# Patient Record
Sex: MALE | Race: AMERICAN INDIAN OR ALASKA NATIVE | NOT HISPANIC OR LATINO | URBAN - METROPOLITAN AREA
[De-identification: names, ages, dates, MRNs, and addresses within clinical notes are randomized per-mention and may not be internally consistent; named-entity substitution may affect disease eponyms.]

---

## 2019-11-28 ENCOUNTER — HOSPITAL ENCOUNTER (INPATIENT)
Facility: HOSPITAL | Age: 59
LOS: 1 days | Discharge: HOME | DRG: 493 | End: 2019-11-30
Attending: EMERGENCY MEDICINE | Admitting: ORTHOPAEDIC SURGERY
Payer: COMMERCIAL

## 2019-11-28 ENCOUNTER — APPOINTMENT (EMERGENCY)
Dept: RADIOLOGY | Facility: HOSPITAL | Age: 59
DRG: 493 | End: 2019-11-28
Attending: EMERGENCY MEDICINE
Payer: COMMERCIAL
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
## 2019-11-28 DIAGNOSIS — R31.29 OTHER MICROSCOPIC HEMATURIA: ICD-10-CM

## 2019-11-28 DIAGNOSIS — S20.229A CONTUSION OF BACK, UNSPECIFIED LATERALITY, INITIAL ENCOUNTER: ICD-10-CM

## 2019-11-28 DIAGNOSIS — S82.851A CLOSED TRIMALLEOLAR FRACTURE OF RIGHT ANKLE, INITIAL ENCOUNTER: ICD-10-CM

## 2019-11-28 LAB
ABO + RH BLD: NORMAL
ANION GAP SERPL CALC-SCNC: 10 MEQ/L (ref 3–15)
APTT PPP: 23 SEC (ref 23–35)
BACTERIA URNS QL MICRO: NORMAL /HPF
BASOPHILS # BLD: 0.02 K/UL (ref 0.01–0.1)
BASOPHILS NFR BLD: 0.4 %
BILIRUB UR QL STRIP.AUTO: NEGATIVE MG/DL
BLD GP AB SCN SERPL QL: NEGATIVE
BUN SERPL-MCNC: 22 MG/DL (ref 8–20)
CALCIUM SERPL-MCNC: 8.7 MG/DL (ref 8.9–10.3)
CHLORIDE SERPL-SCNC: 101 MEQ/L (ref 98–109)
CLARITY UR REFRACT.AUTO: CLEAR
CO2 SERPL-SCNC: 25 MEQ/L (ref 22–32)
COLOR UR AUTO: YELLOW
CREAT SERPL-MCNC: 1 MG/DL
D AG BLD QL: POSITIVE
DIFFERENTIAL METHOD BLD: NORMAL
EOSINOPHIL # BLD: 0.12 K/UL (ref 0.04–0.54)
EOSINOPHIL NFR BLD: 2.3 %
ERYTHROCYTE [DISTWIDTH] IN BLOOD BY AUTOMATED COUNT: 12.9 % (ref 11.6–14.4)
GFR SERPL CREATININE-BSD FRML MDRD: >60 ML/MIN/1.73M*2
GLUCOSE SERPL-MCNC: 94 MG/DL (ref 70–99)
GLUCOSE UR STRIP.AUTO-MCNC: NEGATIVE MG/DL
HCT VFR BLDCO AUTO: 43.2 % (ref 40.1–51)
HGB BLD-MCNC: 14.3 G/DL
HGB UR QL STRIP.AUTO: 2
HYALINE CASTS #/AREA URNS LPF: NORMAL /LPF
IMM GRANULOCYTES # BLD AUTO: 0.02 K/UL (ref 0–0.08)
IMM GRANULOCYTES NFR BLD AUTO: 0.4 %
INR PPP: 1 INR
KETONES UR STRIP.AUTO-MCNC: NEGATIVE MG/DL
LABORATORY COMMENT REPORT: NORMAL
LEUKOCYTE ESTERASE UR QL STRIP.AUTO: NEGATIVE
LYMPHOCYTES # BLD: 2.07 K/UL (ref 1.2–3.5)
LYMPHOCYTES NFR BLD: 39.2 %
MCH RBC QN AUTO: 31.2 PG (ref 28–33.2)
MCHC RBC AUTO-ENTMCNC: 33.1 G/DL (ref 32.2–36.5)
MCV RBC AUTO: 94.1 FL (ref 83–98)
MONOCYTES # BLD: 0.45 K/UL (ref 0.3–1)
MONOCYTES NFR BLD: 8.5 %
NEUTROPHILS # BLD: 2.6 K/UL (ref 1.7–7)
NEUTS SEG NFR BLD: 49.2 %
NITRITE UR QL STRIP.AUTO: NEGATIVE
NRBC BLD-RTO: 0 %
PDW BLD AUTO: 9.8 FL (ref 9.4–12.4)
PH UR STRIP.AUTO: 6.5 [PH]
PLATELET # BLD AUTO: 193 K/UL
POTASSIUM SERPL-SCNC: 3.7 MEQ/L (ref 3.6–5.1)
PROT UR QL STRIP.AUTO: NEGATIVE
PROTHROMBIN TIME: 12.6 SEC (ref 12.2–14.5)
RBC # BLD AUTO: 4.59 M/UL (ref 4.5–5.8)
RBC #/AREA URNS HPF: NORMAL /HPF
SODIUM SERPL-SCNC: 136 MEQ/L (ref 136–144)
SP GR UR REFRACT.AUTO: 1.01
SQUAMOUS URNS QL MICRO: NORMAL /HPF
UROBILINOGEN UR STRIP-ACNC: 0.2 EU/DL
WBC # BLD AUTO: 5.28 K/UL
WBC #/AREA URNS HPF: NORMAL /HPF

## 2019-11-28 PROCEDURE — 63600000 HC DRUGS/DETAIL CODE

## 2019-11-28 PROCEDURE — 73610 X-RAY EXAM OF ANKLE: CPT | Mod: RT

## 2019-11-28 PROCEDURE — 82550 ASSAY OF CK (CPK): CPT | Performed by: HOSPITALIST

## 2019-11-28 PROCEDURE — 96374 THER/PROPH/DIAG INJ IV PUSH: CPT | Mod: 59

## 2019-11-28 PROCEDURE — 63600000 HC DRUGS/DETAIL CODE: Performed by: PHYSICIAN ASSISTANT

## 2019-11-28 PROCEDURE — 85610 PROTHROMBIN TIME: CPT | Performed by: PHYSICIAN ASSISTANT

## 2019-11-28 PROCEDURE — 85025 COMPLETE CBC W/AUTO DIFF WBC: CPT | Performed by: PHYSICIAN ASSISTANT

## 2019-11-28 PROCEDURE — 80048 BASIC METABOLIC PNL TOTAL CA: CPT | Performed by: PHYSICIAN ASSISTANT

## 2019-11-28 PROCEDURE — 25800000 HC PHARMACY IV SOLUTIONS: Performed by: PHYSICIAN ASSISTANT

## 2019-11-28 PROCEDURE — 36415 COLL VENOUS BLD VENIPUNCTURE: CPT | Performed by: PHYSICIAN ASSISTANT

## 2019-11-28 PROCEDURE — 93005 ELECTROCARDIOGRAM TRACING: CPT | Performed by: PHYSICIAN ASSISTANT

## 2019-11-28 PROCEDURE — 96361 HYDRATE IV INFUSION ADD-ON: CPT | Mod: 59

## 2019-11-28 PROCEDURE — 85730 THROMBOPLASTIN TIME PARTIAL: CPT | Performed by: PHYSICIAN ASSISTANT

## 2019-11-28 PROCEDURE — 96376 TX/PRO/DX INJ SAME DRUG ADON: CPT | Mod: 59

## 2019-11-28 PROCEDURE — 99285 EMERGENCY DEPT VISIT HI MDM: CPT | Mod: 25

## 2019-11-28 PROCEDURE — 81001 URINALYSIS AUTO W/SCOPE: CPT | Performed by: PHYSICIAN ASSISTANT

## 2019-11-28 PROCEDURE — 73600 X-RAY EXAM OF ANKLE: CPT | Mod: RT

## 2019-11-28 PROCEDURE — 86850 RBC ANTIBODY SCREEN: CPT

## 2019-11-28 RX ORDER — HYDROMORPHONE HYDROCHLORIDE 1 MG/ML
INJECTION, SOLUTION INTRAMUSCULAR; INTRAVENOUS; SUBCUTANEOUS
Status: COMPLETED
Start: 2019-11-28 | End: 2019-11-28

## 2019-11-28 RX ORDER — HYDROMORPHONE HYDROCHLORIDE 1 MG/ML
0.5 INJECTION, SOLUTION INTRAMUSCULAR; INTRAVENOUS; SUBCUTANEOUS ONCE
Status: COMPLETED | OUTPATIENT
Start: 2019-11-28 | End: 2019-11-28

## 2019-11-28 RX ORDER — VALSARTAN 160 MG/1
160 TABLET ORAL DAILY
Status: ON HOLD | COMMUNITY
End: 2019-11-29

## 2019-11-28 RX ADMIN — HYDROMORPHONE HYDROCHLORIDE 0.5 MG: 1 INJECTION, SOLUTION INTRAMUSCULAR; INTRAVENOUS; SUBCUTANEOUS at 21:41

## 2019-11-28 RX ADMIN — SODIUM CHLORIDE 1000 ML: 9 INJECTION, SOLUTION INTRAVENOUS at 20:17

## 2019-11-28 RX ADMIN — SODIUM CHLORIDE 1000 ML: 9 INJECTION, SOLUTION INTRAVENOUS at 21:57

## 2019-11-28 RX ADMIN — HYDROMORPHONE HYDROCHLORIDE 0.5 MG: 1 INJECTION, SOLUTION INTRAMUSCULAR; INTRAVENOUS; SUBCUTANEOUS at 20:00

## 2019-11-28 ASSESSMENT — ENCOUNTER SYMPTOMS
HEADACHES: 0
ABDOMINAL DISTENTION: 0
FACIAL SWELLING: 0
NECK PAIN: 0
FEVER: 0
CHILLS: 0
BACK PAIN: 0
ARTHRALGIAS: 1
DIAPHORESIS: 0
CONSTIPATION: 0
CHEST TIGHTNESS: 0
NAUSEA: 0
DIFFICULTY BREATHING: 0
DIARRHEA: 0
SHORTNESS OF BREATH: 0
COLOR CHANGE: 0
ACTIVITY CHANGE: 0
LOSS OF CONSCIOUSNESS: 0
ABDOMINAL PAIN: 0
VOMITING: 0
FATIGUE: 0
WOUND: 0

## 2019-11-29 ENCOUNTER — APPOINTMENT (INPATIENT)
Dept: RADIOLOGY | Facility: HOSPITAL | Age: 59
DRG: 493 | End: 2019-11-29
Attending: EMERGENCY MEDICINE
Payer: COMMERCIAL

## 2019-11-29 ENCOUNTER — ANESTHESIA (INPATIENT)
Dept: OPERATING ROOM | Facility: HOSPITAL | Age: 59
DRG: 493 | End: 2019-11-29
Payer: COMMERCIAL

## 2019-11-29 ENCOUNTER — APPOINTMENT (INPATIENT)
Dept: RADIOLOGY | Facility: HOSPITAL | Age: 59
DRG: 493 | End: 2019-11-29
Attending: STUDENT IN AN ORGANIZED HEALTH CARE EDUCATION/TRAINING PROGRAM
Payer: COMMERCIAL

## 2019-11-29 ENCOUNTER — ANESTHESIA EVENT (INPATIENT)
Dept: OPERATING ROOM | Facility: HOSPITAL | Age: 59
DRG: 493 | End: 2019-11-29
Payer: COMMERCIAL

## 2019-11-29 ENCOUNTER — APPOINTMENT (INPATIENT)
Dept: RADIOLOGY | Facility: HOSPITAL | Age: 59
DRG: 493 | End: 2019-11-29
Attending: NURSE PRACTITIONER
Payer: COMMERCIAL

## 2019-11-29 ENCOUNTER — APPOINTMENT (INPATIENT)
Dept: RADIOLOGY | Facility: HOSPITAL | Age: 59
DRG: 493 | End: 2019-11-29
Attending: ORTHOPAEDIC SURGERY
Payer: COMMERCIAL

## 2019-11-29 PROBLEM — S82.851A CLOSED TRIMALLEOLAR FRACTURE OF RIGHT ANKLE: Status: ACTIVE | Noted: 2019-11-28

## 2019-11-29 PROBLEM — I10 ESSENTIAL HYPERTENSION: Status: ACTIVE | Noted: 2019-11-29

## 2019-11-29 PROBLEM — Z01.818 PRE-OP EVALUATION: Status: ACTIVE | Noted: 2019-11-29

## 2019-11-29 PROBLEM — R82.90 ABNORMAL FINDING ON URINALYSIS: Status: ACTIVE | Noted: 2019-11-29
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
## 2019-11-29 PROBLEM — D68.51 FACTOR V LEIDEN (CMS/HCC): Status: ACTIVE | Noted: 2019-11-29

## 2019-11-29 PROBLEM — Z86.718 HISTORY OF DVT (DEEP VEIN THROMBOSIS): Status: ACTIVE | Noted: 2019-11-29

## 2019-11-29 LAB
ATRIAL RATE: 68
CK SERPL-CCNC: 160 U/L (ref 16–300)
P AXIS: 42
PR INTERVAL: 160
QRS DURATION: 90
QT INTERVAL: 434
QTC CALCULATION(BAZETT): 461
R AXIS: 31
T WAVE AXIS: 56
VENTRICULAR RATE: 68

## 2019-11-29 PROCEDURE — 36000012 HC OR LEVEL 2 EA ADDL MIN: Performed by: ORTHOPAEDIC SURGERY

## 2019-11-29 PROCEDURE — 25000000 HC PHARMACY GENERAL: Performed by: ANESTHESIOLOGY

## 2019-11-29 PROCEDURE — 27200000 HC STERILE SUPPLY: Performed by: ORTHOPAEDIC SURGERY

## 2019-11-29 PROCEDURE — 71000001 HC PACU PHASE 1 INITIAL 30MIN: Performed by: ORTHOPAEDIC SURGERY

## 2019-11-29 PROCEDURE — 25800000 HC PHARMACY IV SOLUTIONS: Performed by: STUDENT IN AN ORGANIZED HEALTH CARE EDUCATION/TRAINING PROGRAM

## 2019-11-29 PROCEDURE — 63700000 HC SELF-ADMINISTRABLE DRUG: Performed by: STUDENT IN AN ORGANIZED HEALTH CARE EDUCATION/TRAINING PROGRAM

## 2019-11-29 PROCEDURE — 27800000 HC SUPPLY/IMPLANTS: Performed by: ORTHOPAEDIC SURGERY

## 2019-11-29 PROCEDURE — 73600 X-RAY EXAM OF ANKLE: CPT | Mod: RT

## 2019-11-29 PROCEDURE — 71045 X-RAY EXAM CHEST 1 VIEW: CPT

## 2019-11-29 PROCEDURE — 0QSJ35Z REPOSITION RIGHT FIBULA WITH EXTERNAL FIXATION DEVICE, PERCUTANEOUS APPROACH: ICD-10-PCS | Performed by: ORTHOPAEDIC SURGERY

## 2019-11-29 PROCEDURE — 63600000 HC DRUGS/DETAIL CODE: Performed by: STUDENT IN AN ORGANIZED HEALTH CARE EDUCATION/TRAINING PROGRAM

## 2019-11-29 PROCEDURE — 0QSG35Z REPOSITION RIGHT TIBIA WITH EXTERNAL FIXATION DEVICE, PERCUTANEOUS APPROACH: ICD-10-PCS | Performed by: ORTHOPAEDIC SURGERY

## 2019-11-29 PROCEDURE — C1713 ANCHOR/SCREW BN/BN,TIS/BN: HCPCS | Performed by: ORTHOPAEDIC SURGERY

## 2019-11-29 PROCEDURE — 71000011 HC PACU PHASE 1 EA ADDL MIN: Performed by: ORTHOPAEDIC SURGERY

## 2019-11-29 PROCEDURE — 63600105 HC IODINE BASED CONTRAST: Performed by: PHYSICIAN ASSISTANT

## 2019-11-29 PROCEDURE — 63600000 HC DRUGS/DETAIL CODE: Performed by: NURSE PRACTITIONER

## 2019-11-29 PROCEDURE — 12000000 HC ROOM AND CARE MED/SURG

## 2019-11-29 PROCEDURE — 25800000 HC PHARMACY IV SOLUTIONS: Performed by: ANESTHESIOLOGY

## 2019-11-29 PROCEDURE — 74177 CT ABD & PELVIS W/CONTRAST: CPT

## 2019-11-29 PROCEDURE — 36103180 FL FLUOROSCOPY TECHNICAL ASSISTANCE

## 2019-11-29 PROCEDURE — 63600000 HC DRUGS/DETAIL CODE: Mod: JW | Performed by: ANESTHESIOLOGY

## 2019-11-29 PROCEDURE — 99222 1ST HOSP IP/OBS MODERATE 55: CPT | Performed by: HOSPITALIST

## 2019-11-29 PROCEDURE — 73700 CT LOWER EXTREMITY W/O DYE: CPT | Mod: RT

## 2019-11-29 PROCEDURE — 71260 CT THORAX DX C+: CPT

## 2019-11-29 PROCEDURE — 36000002 HC OR LEVEL 2 INITIAL 30MIN: Performed by: ORTHOPAEDIC SURGERY

## 2019-11-29 PROCEDURE — 63600000 HC DRUGS/DETAIL CODE: Performed by: PHYSICIAN ASSISTANT

## 2019-11-29 PROCEDURE — 37000001 HC ANESTHESIA GENERAL: Performed by: ORTHOPAEDIC SURGERY

## 2019-11-29 DEVICE — ROD CARBON FIBER 11MM X 400MM: Type: IMPLANTABLE DEVICE | Site: ANKLE | Status: FUNCTIONAL

## 2019-11-29 DEVICE — IMPLANTABLE DEVICE: Type: IMPLANTABLE DEVICE | Site: ANKLE | Status: FUNCTIONAL

## 2019-11-29 DEVICE — CLAMP EXTERNAL FIXATOR MR SAFE: Type: IMPLANTABLE DEVICE | Site: ANKLE | Status: FUNCTIONAL

## 2019-11-29 DEVICE — ROD EXTERNAL FIXATOR: Type: IMPLANTABLE DEVICE | Site: ANKLE | Status: FUNCTIONAL

## 2019-11-29 DEVICE — ROD 11MM: Type: IMPLANTABLE DEVICE | Site: ANKLE | Status: FUNCTIONAL

## 2019-11-29 DEVICE — CLAMP COMBINATION MR SAFE: Type: IMPLANTABLE DEVICE | Site: ANKLE | Status: FUNCTIONAL

## 2019-11-29 RX ORDER — ENOXAPARIN SODIUM 100 MG/ML
40 INJECTION SUBCUTANEOUS
Qty: 5.6 ML | Refills: 0 | Status: SHIPPED | OUTPATIENT
Start: 2019-11-30 | End: 2019-12-14

## 2019-11-29 RX ORDER — SODIUM CHLORIDE 9 MG/ML
INJECTION, SOLUTION INTRAVENOUS CONTINUOUS
Status: ACTIVE | OUTPATIENT
Start: 2019-11-29 | End: 2019-11-30

## 2019-11-29 RX ORDER — PHENYLEPHRINE HCL IN 0.9% NACL 1 MG/10 ML
SYRINGE (ML) INTRAVENOUS AS NEEDED
Status: DISCONTINUED | OUTPATIENT
Start: 2019-11-29 | End: 2019-11-29 | Stop reason: SURG

## 2019-11-29 RX ORDER — ENOXAPARIN SODIUM 100 MG/ML
40 INJECTION SUBCUTANEOUS
Status: DISCONTINUED | OUTPATIENT
Start: 2019-11-29 | End: 2019-11-30 | Stop reason: HOSPADM

## 2019-11-29 RX ORDER — ASPIRIN 81 MG/1
81 TABLET ORAL DAILY
Status: ON HOLD | COMMUNITY
End: 2019-11-30 | Stop reason: SDUPTHER

## 2019-11-29 RX ORDER — DEXAMETHASONE SODIUM PHOSPHATE 4 MG/ML
INJECTION, SOLUTION INTRA-ARTICULAR; INTRALESIONAL; INTRAMUSCULAR; INTRAVENOUS; SOFT TISSUE AS NEEDED
Status: DISCONTINUED | OUTPATIENT
Start: 2019-11-29 | End: 2019-11-29 | Stop reason: SURG

## 2019-11-29 RX ORDER — FENTANYL CITRATE 50 UG/ML
50 INJECTION, SOLUTION INTRAMUSCULAR; INTRAVENOUS
Status: DISCONTINUED | OUTPATIENT
Start: 2019-11-29 | End: 2019-11-29 | Stop reason: HOSPADM

## 2019-11-29 RX ORDER — SODIUM CHLORIDE, SODIUM GLUCONATE, SODIUM ACETATE, POTASSIUM CHLORIDE AND MAGNESIUM CHLORIDE 30; 37; 368; 526; 502 MG/100ML; MG/100ML; MG/100ML; MG/100ML; MG/100ML
INJECTION, SOLUTION INTRAVENOUS CONTINUOUS PRN
Status: DISCONTINUED | OUTPATIENT
Start: 2019-11-29 | End: 2019-11-29 | Stop reason: SURG

## 2019-11-29 RX ORDER — ONDANSETRON HYDROCHLORIDE 2 MG/ML
4 INJECTION, SOLUTION INTRAVENOUS
Status: DISCONTINUED | OUTPATIENT
Start: 2019-11-29 | End: 2019-11-29 | Stop reason: HOSPADM

## 2019-11-29 RX ORDER — LIDOCAINE HYDROCHLORIDE 10 MG/ML
INJECTION, SOLUTION EPIDURAL; INFILTRATION; INTRACAUDAL; PERINEURAL AS NEEDED
Status: DISCONTINUED | OUTPATIENT
Start: 2019-11-29 | End: 2019-11-29 | Stop reason: SURG

## 2019-11-29 RX ORDER — OXYCODONE HYDROCHLORIDE 5 MG/1
5-10 TABLET ORAL EVERY 4 HOURS PRN
Status: DISCONTINUED | OUTPATIENT
Start: 2019-11-29 | End: 2019-11-30 | Stop reason: HOSPADM

## 2019-11-29 RX ORDER — MIDAZOLAM HYDROCHLORIDE 2 MG/2ML
INJECTION, SOLUTION INTRAMUSCULAR; INTRAVENOUS AS NEEDED
Status: DISCONTINUED | OUTPATIENT
Start: 2019-11-29 | End: 2019-11-29 | Stop reason: SURG

## 2019-11-29 RX ORDER — HYDROMORPHONE HYDROCHLORIDE 1 MG/ML
0.5 INJECTION, SOLUTION INTRAMUSCULAR; INTRAVENOUS; SUBCUTANEOUS
Status: DISCONTINUED | OUTPATIENT
Start: 2019-11-29 | End: 2019-11-29 | Stop reason: HOSPADM

## 2019-11-29 RX ORDER — OXYCODONE HYDROCHLORIDE 5 MG/1
5-10 TABLET ORAL EVERY 4 HOURS PRN
Qty: 30 TABLET | Refills: 0 | Status: SHIPPED | OUTPATIENT
Start: 2019-11-29 | End: 2019-12-04

## 2019-11-29 RX ORDER — DEXTROSE 50 % IN WATER (D50W) INTRAVENOUS SYRINGE
25 AS NEEDED
Status: DISCONTINUED | OUTPATIENT
Start: 2019-11-29 | End: 2019-11-30 | Stop reason: HOSPADM

## 2019-11-29 RX ORDER — EPHEDRINE SULFATE/0.9% NACL/PF 50 MG/5 ML
SYRINGE (ML) INTRAVENOUS AS NEEDED
Status: DISCONTINUED | OUTPATIENT
Start: 2019-11-29 | End: 2019-11-29 | Stop reason: SURG

## 2019-11-29 RX ORDER — ENOXAPARIN SODIUM 100 MG/ML
40 INJECTION SUBCUTANEOUS
Status: DISCONTINUED | OUTPATIENT
Start: 2019-11-29 | End: 2019-11-29

## 2019-11-29 RX ORDER — PROPOFOL 10 MG/ML
INJECTION, EMULSION INTRAVENOUS AS NEEDED
Status: DISCONTINUED | OUTPATIENT
Start: 2019-11-29 | End: 2019-11-29 | Stop reason: SURG

## 2019-11-29 RX ORDER — FENTANYL CITRATE 50 UG/ML
INJECTION, SOLUTION INTRAMUSCULAR; INTRAVENOUS AS NEEDED
Status: DISCONTINUED | OUTPATIENT
Start: 2019-11-29 | End: 2019-11-29 | Stop reason: SURG

## 2019-11-29 RX ORDER — AMOXICILLIN 250 MG
1 CAPSULE ORAL 2 TIMES DAILY
Qty: 30 TABLET | Refills: 0 | Status: SHIPPED | OUTPATIENT
Start: 2019-11-29

## 2019-11-29 RX ORDER — ACETAMINOPHEN 325 MG/1
650 TABLET ORAL EVERY 6 HOURS PRN
Status: DISCONTINUED | OUTPATIENT
Start: 2019-11-29 | End: 2019-11-30 | Stop reason: HOSPADM

## 2019-11-29 RX ORDER — LOSARTAN POTASSIUM AND HYDROCHLOROTHIAZIDE 12.5; 5 MG/1; MG/1
1 TABLET ORAL DAILY
Status: ON HOLD | COMMUNITY
End: 2019-11-29

## 2019-11-29 RX ORDER — HYDROMORPHONE HYDROCHLORIDE 1 MG/ML
INJECTION, SOLUTION INTRAMUSCULAR; INTRAVENOUS; SUBCUTANEOUS
Status: DISPENSED
Start: 2019-11-29 | End: 2019-11-29

## 2019-11-29 RX ORDER — CEFAZOLIN SODIUM 1 G/50ML
1 SOLUTION INTRAVENOUS
Status: COMPLETED | OUTPATIENT
Start: 2019-11-29 | End: 2019-11-30

## 2019-11-29 RX ORDER — ONDANSETRON HYDROCHLORIDE 2 MG/ML
INJECTION, SOLUTION INTRAVENOUS AS NEEDED
Status: DISCONTINUED | OUTPATIENT
Start: 2019-11-29 | End: 2019-11-29 | Stop reason: SURG

## 2019-11-29 RX ORDER — CEFAZOLIN SODIUM 1 G/50ML
SOLUTION INTRAVENOUS AS NEEDED
Status: DISCONTINUED | OUTPATIENT
Start: 2019-11-29 | End: 2019-11-29 | Stop reason: SURG

## 2019-11-29 RX ORDER — DEXTROSE 40 %
15-30 GEL (GRAM) ORAL AS NEEDED
Status: DISCONTINUED | OUTPATIENT
Start: 2019-11-29 | End: 2019-11-30 | Stop reason: HOSPADM

## 2019-11-29 RX ORDER — ENOXAPARIN SODIUM 100 MG/ML
40 INJECTION SUBCUTANEOUS
Status: DISCONTINUED | OUTPATIENT
Start: 2019-11-30 | End: 2019-11-29

## 2019-11-29 RX ORDER — IBUPROFEN 200 MG
16-32 TABLET ORAL AS NEEDED
Status: DISCONTINUED | OUTPATIENT
Start: 2019-11-29 | End: 2019-11-30 | Stop reason: HOSPADM

## 2019-11-29 RX ORDER — HYDROMORPHONE HYDROCHLORIDE 1 MG/ML
0.5 INJECTION, SOLUTION INTRAMUSCULAR; INTRAVENOUS; SUBCUTANEOUS ONCE
Status: COMPLETED | OUTPATIENT
Start: 2019-11-29 | End: 2019-11-29

## 2019-11-29 RX ADMIN — Medication 100 MCG: at 08:37

## 2019-11-29 RX ADMIN — Medication 100 MCG: at 08:17

## 2019-11-29 RX ADMIN — LIDOCAINE HYDROCHLORIDE 5 ML: 10 INJECTION, SOLUTION EPIDURAL; INFILTRATION; INTRACAUDAL; PERINEURAL at 07:55

## 2019-11-29 RX ADMIN — CEFAZOLIN SODIUM 1 G: 1 SOLUTION INTRAVENOUS at 23:53

## 2019-11-29 RX ADMIN — SODIUM CHLORIDE: 900 INJECTION, SOLUTION INTRAVENOUS at 15:55

## 2019-11-29 RX ADMIN — Medication 10 MG: at 08:17

## 2019-11-29 RX ADMIN — Medication 100 MCG: at 07:55

## 2019-11-29 RX ADMIN — CEFAZOLIN SODIUM 1 G: 1 SOLUTION INTRAVENOUS at 15:55

## 2019-11-29 RX ADMIN — Medication 100 MCG: at 09:09

## 2019-11-29 RX ADMIN — Medication 100 MCG: at 08:05

## 2019-11-29 RX ADMIN — SODIUM CHLORIDE: 900 INJECTION, SOLUTION INTRAVENOUS at 02:35

## 2019-11-29 RX ADMIN — OXYCODONE HYDROCHLORIDE 10 MG: 5 TABLET ORAL at 05:55

## 2019-11-29 RX ADMIN — Medication 10 MG: at 08:37

## 2019-11-29 RX ADMIN — Medication 100 MCG: at 08:57

## 2019-11-29 RX ADMIN — OXYCODONE HYDROCHLORIDE 5 MG: 5 TABLET ORAL at 02:25

## 2019-11-29 RX ADMIN — Medication 10 MG: at 08:05

## 2019-11-29 RX ADMIN — CEFAZOLIN SODIUM 2 G: 1 SOLUTION INTRAVENOUS at 08:10

## 2019-11-29 RX ADMIN — FENTANYL CITRATE 50 MCG: 50 INJECTION, SOLUTION INTRAMUSCULAR; INTRAVENOUS at 08:21

## 2019-11-29 RX ADMIN — SODIUM CHLORIDE, SODIUM GLUCONATE, SODIUM ACETATE, POTASSIUM CHLORIDE AND MAGNESIUM CHLORIDE: 526; 502; 368; 37; 30 INJECTION, SOLUTION INTRAVENOUS at 08:36

## 2019-11-29 RX ADMIN — IOHEXOL 150 ML: 300 INJECTION, SOLUTION INTRAVENOUS at 00:31

## 2019-11-29 RX ADMIN — OXYCODONE HYDROCHLORIDE 10 MG: 5 TABLET ORAL at 22:17

## 2019-11-29 RX ADMIN — MIDAZOLAM HYDROCHLORIDE 2 MG: 1 INJECTION, SOLUTION INTRAMUSCULAR; INTRAVENOUS at 07:53

## 2019-11-29 RX ADMIN — Medication 100 MCG: at 08:02

## 2019-11-29 RX ADMIN — PROPOFOL 200 MG: 10 INJECTION, EMULSION INTRAVENOUS at 07:55

## 2019-11-29 RX ADMIN — DEXAMETHASONE SODIUM PHOSPHATE 4 MG: 4 INJECTION, SOLUTION INTRA-ARTICULAR; INTRALESIONAL; INTRAMUSCULAR; INTRAVENOUS; SOFT TISSUE at 07:59

## 2019-11-29 RX ADMIN — SODIUM CHLORIDE: 900 INJECTION, SOLUTION INTRAVENOUS at 07:53

## 2019-11-29 RX ADMIN — ONDANSETRON 4 MG: 2 INJECTION INTRAMUSCULAR; INTRAVENOUS at 09:50

## 2019-11-29 RX ADMIN — Medication 10 MG: at 08:57

## 2019-11-29 RX ADMIN — HYDROMORPHONE HYDROCHLORIDE 0.5 MG: 1 INJECTION, SOLUTION INTRAMUSCULAR; INTRAVENOUS; SUBCUTANEOUS at 01:18

## 2019-11-29 RX ADMIN — FENTANYL CITRATE 100 MCG: 50 INJECTION, SOLUTION INTRAMUSCULAR; INTRAVENOUS at 07:55

## 2019-11-29 RX ADMIN — OXYCODONE HYDROCHLORIDE 5 MG: 5 TABLET ORAL at 15:20

## 2019-11-29 RX ADMIN — ACETAMINOPHEN 650 MG: 325 TABLET, FILM COATED ORAL at 22:17

## 2019-11-29 RX ADMIN — Medication 10 MG: at 09:09

## 2019-11-29 RX ADMIN — FENTANYL CITRATE 50 MCG: 50 INJECTION, SOLUTION INTRAMUSCULAR; INTRAVENOUS at 08:50

## 2019-11-29 RX ADMIN — FENTANYL CITRATE 50 MCG: 50 INJECTION, SOLUTION INTRAMUSCULAR; INTRAVENOUS at 08:24

## 2019-11-29 RX ADMIN — ENOXAPARIN SODIUM 40 MG: 40 INJECTION, SOLUTION INTRAVENOUS; SUBCUTANEOUS at 18:33

## 2019-11-29 ASSESSMENT — COGNITIVE AND FUNCTIONAL STATUS - GENERAL
HELP NEEDED FOR BATHING: 3 - A LITTLE
CLIMB 3 TO 5 STEPS WITH RAILING: 2 - A LOT
TOILETING: 3 - A LITTLE
DRESSING REGULAR LOWER BODY CLOTHING: 2 - A LOT
STANDING UP FROM CHAIR USING ARMS: 2 - A LOT
EATING MEALS: 4 - NONE
WALKING IN HOSPITAL ROOM: 2 - A LOT
MOVING TO AND FROM BED TO CHAIR: 2 - A LOT
HELP NEEDED FOR PERSONAL GROOMING: 4 - NONE
DRESSING REGULAR UPPER BODY CLOTHING: 4 - NONE

## 2019-11-29 ASSESSMENT — ENCOUNTER SYMPTOMS
NUMBNESS: 0
DIZZINESS: 0
LIGHT-HEADEDNESS: 0
FLANK PAIN: 0
WEAKNESS: 0

## 2019-11-29 ASSESSMENT — PAIN SCALES - GENERAL: PAIN_LEVEL: 2

## 2019-11-29 NOTE — BRIEF OP NOTE
CLOSED REDUCTION FRACTURE EXTERNAL FIXATOR LARGE (R) Procedure Note    Procedure:    CLOSED REDUCTION FRACTURE EXTERNAL FIXATOR LARGE  CPT(R) Code:  13352 - MS CLOSED RX ANKLE DISLOCATN,ANESTH      Pre-op Diagnosis     * Closed trimalleolar fracture of right ankle, initial encounter [S82.933F]       Post-op Diagnosis     * Closed trimalleolar fracture of right ankle, initial encounter [S82.521J]    Surgeon(s) and Role:     * Jovanny Friend MD - Primary    Anesthesia: General    Staff:   Circulator: Camila Short RN  Scrub Person: Camila Cr RN    Procedure Details   Application of RLE ex-fix.     Estimated Blood Loss: 0    Specimens:                No specimens collected during this procedure.      Drains: * No LDAs found *    Implants:   Implant Name Type Inv. Item Serial No.  Lot No. LRB No. Used   AVANI CARBON FIBER 11MM X 400MM - [id number]  AVANI CARBON FIBER 11MM X 400MM  SYNTHES TRAUMA/ORTHO  Right 1   AVANI EXTERNAL FIXATOR - [id number]  AVANI EXTERNAL FIXATOR  SYNTHES TRAUMA/ORTHO  Right 1   SCREW 5.0MM SCHANZ BLUNTED TROCAR POINT 200MM - [id number]  SCREW 5.0MM SCHANZ BLUNTED TROCAR POINT 200MM  SYNTHES TRAUMA/ORTHO  Right 2   SCREW 4.0MM SELF-DRILLING SCHANZ 40MM THRD/150MM - [id number] Bone screw SCREW 4.0MM SELF-DRILLING SCHANZ 40MM THRD/150MM  SYNTHES TRAUMA/ORTHO  Right 1   CLAMP EXTERNAL FIXATOR MR LOPEZ - [id number]  CLAMP EXTERNAL FIXATOR MR SAFE  SYNTHES TRAUMA/ORTHO  Right 1   CLAMP COMBINATION MR LOPEZ - [id number]  CLAMP COMBINATION MR SAFE  SYNTHES TRAUMA/ORTHO  Right 4   AVANI EXTERNAL FIXATOR - [id number]  AVANI EXTERNAL FIXATOR  SYNTHES TRAUMA/ORTHO  Right 1   ATTACHMENT, MEDIUM 6 POSITION - [id number]  ATTACHMENT, MEDIUM 6 POSITION  SYNTHES TRAUMA/ORTHO  Right 1   AVANI 11MM - [id number]  AVANI 11MM  SYNTHES TRAUMA/ORTHO  Right 1   SCREW 4.0MM SELF-DRILLING SCHANZ 40MM THRD/150MM - [id number] Bone screw SCREW 4.0MM SELF-DRILLING SCHANZ 40MM THRD/150MM  SYNTHES TRAUMA/ORTHO  Right 1               Complications:  None; patient tolerated the procedure well.           Disposition: PACU - hemodynamically stable.           Condition: stable      Nonweightbearing RLE  CT R ankle  Compartment checks    Jovanny Friend MD  Phone Number: [phone]

## 2019-11-29 NOTE — PROGRESS NOTES
Lovenox e-scribed to [facility] by ortho CRNP. Called pharmacy [phone]. Medication in stock. Co-pay 10 $. Patient informed.

## 2019-11-29 NOTE — PROGRESS NOTES
"Compartment Check    - No acute distress  - Active extension and flexion of the great toe  - No pain with passive stretch of the great toe in flexion or extension  - Compartments tense but compressible with minimal TTP in the anterior and medial compartments (2/10 \"soreness\")  - SILT Sural, Saphenous, SPN, DPN, and Tibial Nerve Distributions.  - Palpable DP pulses equal bilaterally   - Toes warm and well perfused  - No signs or symptoms of an evolving compartment syndrome at this time  - Continue monitoring compartments q4hr  - Non-weight-bearing RLE    Efraín Malhotra MD      "
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
## 2019-11-29 NOTE — ANESTHESIA POSTPROCEDURE EVALUATION
Patient: Marvin Sharp    Procedure Summary     Date:  11/29/19 Room / Location:  [facility] PAV OR 01 / [facility] OR [facility]    Anesthesia Start:  0753 Anesthesia Stop:  1009    Procedure:  CLOSED REDUCTION FRACTURE EXTERNAL FIXATOR LARGE (Right ) Diagnosis:       Closed trimalleolar fracture of right ankle, initial encounter      (Closed trimalleolar fracture of right ankle, initial encounter [S82.851A])    Surgeon:  Jovanny Friend MD Responsible Provider:  Aakash Gastelum MD    Anesthesia Type:  general ASA Status:  2          Anesthesia Type: general  PACU Vitals     No data found in the last 10 encounters.        HR 90  /59  RR 16  SpO2 98% 3LNC  T 98.2    Anesthesia Post Evaluation    Pain score: 2  Pain management: adequate  Patient location during evaluation: PACU  Patient participation: complete - patient participated  Level of consciousness: awake and alert  Cardiovascular status: acceptable  Airway Patency: adequate  Respiratory status: acceptable  Hydration status: acceptable  Anesthetic complications: no
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
## 2019-11-29 NOTE — ASSESSMENT & PLAN NOTE
Secondary to factor V mutation  Only on asa 81 mg daily at home  Recommend post operatively that patient be placed on full anticoagulation (eliquis vs xarelto vs lovenox) until no longer has limb immobility to reduce risk of repeat DVT

## 2019-11-29 NOTE — ANESTHESIA PREPROCEDURE EVALUATION
Relevant Problems   CARDIOVASCULAR   (+) Essential hypertension      NEUROLOGY   (+) History of DVT (deep vein thrombosis)      Other   (+) Factor V Leiden (CMS/HCC)       Anesthesia ROS/MED HX    Anesthesia History - neg  Pulmonary - neg  Neuro/Psych - neg  Cardiovascular   hypertension  GI/Hepatic- neg  Musculoskeletal- neg  Renal Disease- neg  Endo/Other- neg  Body Habitus: Obese  ROS/MED HX Comments:    Hematological: Factor V Leiden heterozygote       Past Surgical History:   Procedure Laterality Date   • VARICOSE VEIN SURGERY       CBC Results       11/28/19 1956           WBC 5.28           RBC 4.59           HGB 14.3           HCT 43.2           MCV 94.1           MCH 31.2           MCHC 33.1                                    BMP Results       11/28/19 1955                      K 3.7           Cl 101           CO2 25           Glucose 94           BUN 22           Creatinine 1.0           Calcium 8.7           Anion Gap 10           EGFR >60.0           Comment for K at 1955 on 11/28/19:    Results obtained on plasma. Plasma Potassium values may be up to 0.4 mEQ/L less than serum values. The differences may be greater for patients with high platelet or white cell counts.        11/28/19 EKG:  Normal sinus rhythm  Inferior infarct , age undetermined            Physical Exam    Airway   Mallampati: II   TM distance: >3 FB   Neck ROM: full  Cardiovascular - normal   Rhythm: regular   Rate: normalPulmonary - normal   clear to auscultation  Dental - normal        Anesthesia Plan    Plan: general    Technique: general LMA   ASA 2  Anesthetic plan and risks discussed with: patient  Induction:    intravenous   Postop Plan:   Patient Disposition: inpatient floor planned admission   Pain Management: IV analgesics

## 2019-11-29 NOTE — PROGRESS NOTES
"Compartment Check    - No acute distress  - Active extension and flexion of the great toe  - No pain with passive stretch of the great toe in flexion or extension  - Compartments tense but compressible with minimal TTP in the anterior and medial compartments (2/10 \"soreness\")  - SILT Sural, Saphenous, SPN, DPN, and Tibial Nerve Distributions.  - Palpable DP pulses equal bilaterally   - Toes warm and well perfused  - No signs or symptoms of an evolving compartment syndrome at this time  - Continue monitoring compartments q4hr  - Non-weight-bearing RLE  - No change from previous exam    Efraín Malhotra MD      "
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
## 2019-11-29 NOTE — PLAN OF CARE
Problem: Adult Inpatient Plan of Care  Goal: Plan of Care Review  Outcome: Progressing  Flowsheets (Taken 11/29/2019 5230)  Progress: improving  Plan of Care Reviewed With: patient  Outcome Summary: 2/10 pain after oxycodone.  voiding.  pt refusing to get OOB at this time.  tolerating PO intake.

## 2019-11-29 NOTE — PROGRESS NOTES
Orthopaedics Progress Note    [S]  Patient comfortable in PACU. Pain currently well controlled. Patient responds to questions appropriately and follows commands.    [O]   Vitals:    11/29/19 1010   BP: 111/63   Pulse: 80   Resp: 16   Temp: 36.7 °C (98 °F)   SpO2: 96%       Physical Exam:  SILT  <2 second capillary refill  Compartments tense but compressible  +EHL/FHL  External fixator in place, pin sites with kerlix dressings  Quad fires    [A/P]   59 y.o. male with R pilon ankle fracture s/p ex-fix    -DVT prophylaxis: therapeutic lovenox  -NWB RLE  -PT/OT  -CT R ankle  -Pain control  -Frequent compartment checks - q2h    Sunny Grover MD
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
## 2019-11-29 NOTE — PLAN OF CARE
Problem: Adult Inpatient Plan of Care  Goal: Plan of Care Review  Outcome: Progressing  Flowsheets (Taken 11/29/2019 0453)  Progress: no change  Plan of Care Reviewed With: patient  Outcome Summary: patient with pain 6/10 x1 oxycodone given. NPO for OR today - RLE elevated above heart, able to palpate pulses, no signs of compartment syndrome, will continue to monitor q2h.
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
## 2019-11-29 NOTE — NURSING NOTE
Pt to OR via bed escorted by Dr. Grover and PCT.  Called both pre-op and PACU areas to give report with no answer.
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
## 2019-11-29 NOTE — ASSESSMENT & PLAN NOTE
Cont w pain management. rec PT/OT eval and treat. Further management per ortho surgery team.  Once cleared by ortho patient should be started on anticoagulation bc high risk of developing blood clots since factor V mutation  Pain control as per ortho.

## 2019-11-29 NOTE — ASSESSMENT & PLAN NOTE
Patient has no difficulty with ambulation and does not have any SOB with ambulation, He generally runs 2-3 miles on treadmill without any problems, ECG shows NSR - patient is low risk for low risk ankle surgery

## 2019-11-29 NOTE — PROGRESS NOTES
Orthopaedics Progress Note    [S]  Patient having increased pain this morning. No numbness or tingling in feet, still able to move appropriately. NPO for OR this morning    [O]   Vitals:    11/29/19 0300   BP: 121/75   Pulse: 73   Resp: 16   Temp: 36.7 °C (98.1 °F)   SpO2: 98%       Physical Exam:  SILT  <2 second capillary refill  Compartments remain soft and compressible  +EHL/FHL  Splint C/d/i  Quad fires    [A/P]   59 y.o. male with R pilon ankle fracture    -OR today for external fixator application  -Full AC following procedure due to hx of Factor V Leiden    Sunny Grover MD     I performed a history and physical exam of the patient and discussed their management with the resident and /or advanced care provider. I reviewed the resident and /or ACP's note and agree with the documented findings and plan of care. My medical decison making and observations are found above.  Abd soft, lungs clear, nl mental status and memory. no trauma

## 2019-11-30 VITALS
WEIGHT: 245.81 LBS | TEMPERATURE: 98.2 F | HEART RATE: 65 BPM | DIASTOLIC BLOOD PRESSURE: 64 MMHG | BODY MASS INDEX: 34.41 KG/M2 | HEIGHT: 71 IN | RESPIRATION RATE: 18 BRPM | SYSTOLIC BLOOD PRESSURE: 122 MMHG | OXYGEN SATURATION: 98 %

## 2019-11-30 LAB
ANION GAP SERPL CALC-SCNC: 5 MEQ/L (ref 3–15)
BUN SERPL-MCNC: 14 MG/DL (ref 8–20)
CALCIUM SERPL-MCNC: 7.5 MG/DL (ref 8.9–10.3)
CHLORIDE SERPL-SCNC: 106 MEQ/L (ref 98–109)
CO2 SERPL-SCNC: 23 MEQ/L (ref 22–32)
CREAT SERPL-MCNC: 0.8 MG/DL
ERYTHROCYTE [DISTWIDTH] IN BLOOD BY AUTOMATED COUNT: 12.6 % (ref 11.6–14.4)
GFR SERPL CREATININE-BSD FRML MDRD: >60 ML/MIN/1.73M*2
GLUCOSE SERPL-MCNC: 123 MG/DL (ref 70–99)
HCT VFR BLDCO AUTO: 35.1 % (ref 40.1–51)
HGB BLD-MCNC: 11.6 G/DL
MCH RBC QN AUTO: 31.4 PG (ref 28–33.2)
MCHC RBC AUTO-ENTMCNC: 33 G/DL (ref 32.2–36.5)
MCV RBC AUTO: 95.1 FL (ref 83–98)
PDW BLD AUTO: 10 FL (ref 9.4–12.4)
PLATELET # BLD AUTO: 165 K/UL
POTASSIUM SERPL-SCNC: 4.1 MEQ/L (ref 3.6–5.1)
RBC # BLD AUTO: 3.69 M/UL (ref 4.5–5.8)
SODIUM SERPL-SCNC: 134 MEQ/L (ref 136–144)
WBC # BLD AUTO: 5.6 K/UL

## 2019-11-30 PROCEDURE — 97530 THERAPEUTIC ACTIVITIES: CPT | Mod: GP

## 2019-11-30 PROCEDURE — 97162 PT EVAL MOD COMPLEX 30 MIN: CPT

## 2019-11-30 PROCEDURE — 63700000 HC SELF-ADMINISTRABLE DRUG: Performed by: STUDENT IN AN ORGANIZED HEALTH CARE EDUCATION/TRAINING PROGRAM

## 2019-11-30 PROCEDURE — 36415 COLL VENOUS BLD VENIPUNCTURE: CPT | Performed by: STUDENT IN AN ORGANIZED HEALTH CARE EDUCATION/TRAINING PROGRAM

## 2019-11-30 PROCEDURE — 97166 OT EVAL MOD COMPLEX 45 MIN: CPT

## 2019-11-30 PROCEDURE — 85027 COMPLETE CBC AUTOMATED: CPT | Performed by: STUDENT IN AN ORGANIZED HEALTH CARE EDUCATION/TRAINING PROGRAM

## 2019-11-30 PROCEDURE — 80048 BASIC METABOLIC PNL TOTAL CA: CPT | Performed by: STUDENT IN AN ORGANIZED HEALTH CARE EDUCATION/TRAINING PROGRAM

## 2019-11-30 PROCEDURE — 99232 SBSQ HOSP IP/OBS MODERATE 35: CPT | Performed by: HOSPITALIST

## 2019-11-30 RX ORDER — ASPIRIN 81 MG/1
81 TABLET ORAL DAILY
Qty: 30 TABLET | Refills: 0 | Status: SHIPPED | OUTPATIENT
Start: 2019-12-14 | End: 2020-01-13

## 2019-11-30 RX ADMIN — OXYCODONE HYDROCHLORIDE 10 MG: 5 TABLET ORAL at 09:32

## 2019-11-30 RX ADMIN — ACETAMINOPHEN 650 MG: 325 TABLET, FILM COATED ORAL at 09:44

## 2019-11-30 ASSESSMENT — COGNITIVE AND FUNCTIONAL STATUS - GENERAL
EATING MEALS: 4 - NONE
TOILETING: 3 - A LITTLE
AFFECT: WFL
HELP NEEDED FOR BATHING: 2 - A LOT
HELP NEEDED FOR PERSONAL GROOMING: 4 - NONE
DRESSING REGULAR UPPER BODY CLOTHING: 4 - NONE
AFFECT: WFL
DRESSING REGULAR LOWER BODY CLOTHING: 3 - A LITTLE
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
## 2019-11-30 NOTE — PLAN OF CARE
Problem: Adult Inpatient Plan of Care  Goal: Plan of Care Review  Outcome: Progressing  Flowsheets (Taken 11/30/2019 1130)  Progress: improving  Plan of Care Reviewed With: patient  Outcome Summary: PT josh sage

## 2019-11-30 NOTE — PLAN OF CARE
Problem: Adult Inpatient Plan of Care  Goal: Plan of Care Review  Outcome: Progressing  Flowsheets (Taken 11/30/2019 1000)  Progress: improving  Plan of Care Reviewed With: patient  Outcome Summary: CHEPE sage
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
## 2019-11-30 NOTE — PLAN OF CARE
Problem: Adult Inpatient Plan of Care  Goal: Plan of Care Review  Outcome: Progressing  Flowsheets (Taken 11/30/2019 0959)  Progress: improving  Plan of Care Reviewed With: patient  Outcome Summary: Spoke with RN and d/c today. Mario discussed with PT who just saw patient and they cleared hi for home. They are getting him a walker and are recommending a commode and wheelchair No home care until after surgery which he will have in 2 weeks closer to home in [place]. Mario met with patient at bedside, he says his wife will come transport him home. He will have his wife purchse a commode at local pharmacy and he has a friend who owns a [employer] company which he can get a wheelchair form if needed. He denies any other needs at this time.    12:30 update- MARIO paged by RN that patient now requesting home care. Mario met with patient, wife and daughters at bedside. He has a PCP Dr. Ceci Frank. HE prefers to use the [facility] system.  MARIO called there and was told they typically refer patients to [facility] for home care. Mario placed call to [facility] [phone]. Spoke to Kaci. Per Anaid they do services pts area, would need info on insurance claim and clinicals and script for home care. MARIO placed call to Resident who said patient doesn't need home care and he will edit dressing needs so its not too much for patient. Mario met with patient and wife again and updated them to what resident said. Also gave them phone # for [facility] in case they are overwhelmed once home. Updated RN.
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
## 2019-11-30 NOTE — PROGRESS NOTES
Orthopaedics Progress Note    [S]  No acute distress. Pain well controlled. Compartment checks fine overnight.    [O]   Vitals:    11/30/19 0300   BP: 112/71   Pulse: 66   Resp: 17   Temp: 36.7 °C (98.1 °F)   SpO2:        Physical Exam:  SILT  +DP/PT  +EHL/FHL/PF/DF  Dressing C/d/i  Quad fires    [A/P]   59 y.o. male status post ex-fix application to R pilon ankle fracture, 1 Day Post-Op, doing well    -DVT prophylaxis: lovenox  -NWB RLE  -Analgesia  -Physical therapy  -Discharge per PT  -Patient to f/u with Dr. Younger in [place]    Sunny Grover MD

## 2019-11-30 NOTE — DISCHARGE INSTRUCTIONS
DVT Prophylaxis:  -Continue with Lovenox 40mg daily for 2 weeks while non weight bearing for blood clot prevention.  -You do not need to take daily aspirin while on Lovenox.    Pin Care:  You do not need to perform pin care at home, please keep dressings in place and keep pin sites clean and dry until you follow up with Dr. Younger in clinic.      Constipation/ Pain Med:   - Take your pain medication with food to prevent nausea  - Do not drive while taking pain medications.   - Pain medications may cause constipation.   - Drink plenty of fluids and eat fresh fruits and vegetables.  - Please take Senna-Colace as prescribed. Hold for loose stool. If you are having difficulties having a bowel movement or haven't had bowel movement in 2 days, please add over the counter miralax and take as directed on package.   - If you have not had a bowel movement in three days please call the office.

## 2019-11-30 NOTE — PROGRESS NOTES
Orthopedic Compartment Check    Patient evaluated for concern for compartment syndrome of the RLE.  No acute distress  Active extension and flexion of the great toe  Appropriate level of pain with passive stretch of the great toe in flexion or extension  Compartments remain compressible with appropriate pain  SILT Sural, Saphenous, SPN, DPN, and Tibial Nerve Distributions.  Palpable DP pulses equal bilaterally  Toes warm and well perfused    - No signs or symptoms of an evolving compartment syndrome at this time  - Continue monitoring compartments  - Non-weight-bearing of affected lower extremity    Sunny Grover MD

## 2019-11-30 NOTE — PLAN OF CARE
Problem: Adult Inpatient Plan of Care  Goal: Plan of Care Review  Outcome: Progressing  Flowsheets (Taken 11/30/2019 0458)  Progress: improving  Plan of Care Reviewed With: patient  Outcome Summary: Pt refusing to get OOB this shift. Voiding without difficulty. Oxycodone 10mg and tylenol PRN for pain relief.

## 2019-11-30 NOTE — NURSING NOTE
"Upon assessment, pt offered to get OOB but refusing. States he \"wants to stay in bed tonight until PT in the AM.\"   "
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
## 2021-04-26 NOTE — ANESTHESIA PROCEDURE NOTES
Airway  Urgency: elective    Start Time: 11/29/2019 7:56 AM    General Information and Staff    Patient location during procedure: OR  Anesthesiologist: Aakash Gastelum MD  Performed: anesthesiologist     Indications and Patient Condition  Indications for airway management: anesthesia  Sedation level: deep  Preoxygenated: yes  Mask difficulty assessment: 1 - vent by mask    Final Airway Details  Final airway type: supraglottic airway      Successful airway: Size 4    Number of attempts at approach: 1  Number of other approaches attempted: 0  Atraumatic airway insertion               Evangelist Bess called to report that patients BP is elevated it was 158/100 she rechecked it and it was 168/110.       She would like for you to give patient a callback with instructions on what she needs to do

## 2023-05-12 ENCOUNTER — NEW PATIENT COMPREHENSIVE (OUTPATIENT)
Dept: URBAN - METROPOLITAN AREA CLINIC 94 | Facility: CLINIC | Age: 63
End: 2023-05-12

## 2023-05-12 DIAGNOSIS — H43.392: ICD-10-CM

## 2023-05-12 DIAGNOSIS — H52.4: ICD-10-CM

## 2023-05-12 PROCEDURE — 92015 DETERMINE REFRACTIVE STATE: CPT

## 2023-05-12 PROCEDURE — 92310 CONTACT LENS FITTING OU: CPT

## 2023-05-12 PROCEDURE — 92004 COMPRE OPH EXAM NEW PT 1/>: CPT

## 2023-05-12 ASSESSMENT — VISUAL ACUITY
OD_CC: 20/20
OS_CC: 20/25
OU_CC: J2

## 2023-05-12 ASSESSMENT — TONOMETRY
OS_IOP_MMHG: 16
OD_IOP_MMHG: 16

## 2024-10-31 ENCOUNTER — ESTABLISHED COMPREHENSIVE EXAM (OUTPATIENT)
Dept: URBAN - METROPOLITAN AREA CLINIC 70 | Facility: CLINIC | Age: 64
End: 2024-10-31

## 2024-10-31 DIAGNOSIS — H43.392: ICD-10-CM

## 2024-10-31 DIAGNOSIS — H52.4: ICD-10-CM

## 2024-10-31 PROCEDURE — 92015 DETERMINE REFRACTIVE STATE: CPT

## 2024-10-31 PROCEDURE — 92310 CONTACT LENS FITTING OU: CPT

## 2024-10-31 PROCEDURE — 92014 COMPRE OPH EXAM EST PT 1/>: CPT

## 2024-10-31 ASSESSMENT — KERATOMETRY
OS_K2POWER_DIOPTERS: 46.00
OS_K1POWER_DIOPTERS: 44.00
OD_K1POWER_DIOPTERS: 43.50
OS_AXISANGLE_DEGREES: 169
OD_AXISANGLE_DEGREES: 30
OS_AXISANGLE2_DEGREES: 79
OD_AXISANGLE2_DEGREES: 120
OD_K2POWER_DIOPTERS: 45.00

## 2024-10-31 ASSESSMENT — TONOMETRY
OD_IOP_MMHG: 15
OS_IOP_MMHG: 15

## 2024-10-31 ASSESSMENT — VISUAL ACUITY
OD_CC: 20/25
OS_CC: 20/40-1

## (undated) DEVICE — DRAPE C-ARM X-RAY EQUIPMENT IMAGE

## (undated) DEVICE — NEEDLE DISP HYPO 18GX1-1/2IN

## (undated) DEVICE — DRESSING ABD STERILE 7X8IN

## (undated) DEVICE — CASTING ROLL PLASTER 4IN

## (undated) DEVICE — DRESSING SPONGE ALL GAUZE 4X4 STER 10PK

## (undated) DEVICE — IMPLANTABLE DEVICE: Type: IMPLANTABLE DEVICE | Status: NON-FUNCTIONAL

## (undated) DEVICE — BLANKET UPPER BODY

## (undated) DEVICE — NEEDLE DISP HYPO 22GX1-1/2IN

## (undated) DEVICE — BANDAGE ELAS DBL LENGTH 6X550 LF NS

## (undated) DEVICE — COVER LIGHTHANDLE

## (undated) DEVICE — GAUZE XEROFORM 5X9 STERILE/OVERWRAPPED PACKET

## (undated) DEVICE — DRESSING TELFA 3X4

## (undated) DEVICE — GOWN SURGICAL REINFORCED X-LAR

## (undated) DEVICE — PAD GROUND ELECTROSURGICAL W/CORD

## (undated) DEVICE — BLADE SCALPEL #15

## (undated) DEVICE — SPLINTS PLASTER 5X30

## (undated) DEVICE — APPLICATOR CHLORAPREP 26ML ORANGE TINT

## (undated) DEVICE — SOLN IRRIG STER WATER 1000ML

## (undated) DEVICE — SUTURE VICRYL 2-0 J417H SH UNDYED

## (undated) DEVICE — DRAPE-U NON-STERILE

## (undated) DEVICE — MANIFOLD SINGLE PORT NEPTUNE

## (undated) DEVICE — DRILL BIT LONG 3.5MM

## (undated) DEVICE — GLOVE SURG PROTEXIS PF 8

## (undated) DEVICE — BANDAGE ELASTIC 4IN MEDC

## (undated) DEVICE — SUTURE VICRYL 3-0 J416H SH UNDYED

## (undated) DEVICE — BANDAGE CONFORM 3IN STERILE

## (undated) DEVICE — DRAPE LARGE REVERSE FOLD

## (undated) DEVICE — GLOVE SURG PROTEXIS PF 7.5

## (undated) DEVICE — STOCKINETTE 6IN MEDC

## (undated) DEVICE — SOLN IRRIG .9%SOD 1000ML

## (undated) DEVICE — Device

## (undated) DEVICE — STERISTRIP 1/2INX4IN

## (undated) DEVICE — ***USE 138697*** SUTURE ETHILON 3-0 1663G PS1

## (undated) DEVICE — CAST PADDING 4IN

## (undated) DEVICE — NEEDLE DISP HYPO 22GX1IN

## (undated) DEVICE — SCISSOR STEVENS TENOTOMY